# Patient Record
Sex: MALE | Race: ASIAN | Employment: FULL TIME | URBAN - METROPOLITAN AREA
[De-identification: names, ages, dates, MRNs, and addresses within clinical notes are randomized per-mention and may not be internally consistent; named-entity substitution may affect disease eponyms.]

---

## 2019-05-13 ENCOUNTER — APPOINTMENT (OUTPATIENT)
Dept: GENERAL RADIOLOGY | Age: 29
End: 2019-05-13
Attending: EMERGENCY MEDICINE
Payer: OTHER MISCELLANEOUS

## 2019-05-13 ENCOUNTER — HOSPITAL ENCOUNTER (EMERGENCY)
Age: 29
Discharge: HOME | End: 2019-05-14
Attending: EMERGENCY MEDICINE
Payer: OTHER MISCELLANEOUS

## 2019-05-13 VITALS
OXYGEN SATURATION: 99 % | SYSTOLIC BLOOD PRESSURE: 118 MMHG | RESPIRATION RATE: 14 BRPM | HEART RATE: 78 BPM | DIASTOLIC BLOOD PRESSURE: 75 MMHG

## 2019-05-13 DIAGNOSIS — S60.221A CONTUSION OF RIGHT HAND, INITIAL ENCOUNTER: Primary | ICD-10-CM

## 2019-05-13 PROCEDURE — 73130 X-RAY EXAM OF HAND: CPT | Performed by: EMERGENCY MEDICINE

## 2019-05-13 PROCEDURE — 99283 EMERGENCY DEPT VISIT LOW MDM: CPT

## 2019-05-13 RX ORDER — IBUPROFEN 600 MG/1
600 TABLET ORAL ONCE
Status: COMPLETED | OUTPATIENT
Start: 2019-05-13 | End: 2019-05-13

## 2019-05-14 NOTE — ED PROVIDER NOTES
Patient Seen in: [address] In [place]    History   Patient presents with:  Upper Extremity Injury (musculoskeletal)    Stated Complaint: r. hand injury (work injury psd 56)    HPI    66-year-old male no significant past medical hist Cardiovascular: Normal rate and regular rhythm. Pulmonary/Chest: Effort normal and breath sounds normal.   Abdominal: Soft. Bowel sounds are normal.   Musculoskeletal: He exhibits no edema or deformity.    Right hand ecchymosis overlying the distal thir Plan     Clinical Impression:  Contusion of right hand, initial encounter  (primary encounter diagnosis)    Disposition:  Discharge  5/13/2019 11:39 pm    Follow-up:  [facility]  Nimisha Cuenca [facility] [id number]  [phone]

## 2021-07-09 ENCOUNTER — WALK IN (OUTPATIENT)
Dept: URGENT CARE | Age: 31
End: 2021-07-09

## 2021-07-09 VITALS
DIASTOLIC BLOOD PRESSURE: 72 MMHG | RESPIRATION RATE: 14 BRPM | OXYGEN SATURATION: 100 % | TEMPERATURE: 97.4 F | HEIGHT: 64 IN | BODY MASS INDEX: 29.19 KG/M2 | WEIGHT: 171 LBS | SYSTOLIC BLOOD PRESSURE: 118 MMHG | HEART RATE: 76 BPM

## 2021-07-09 DIAGNOSIS — Z11.1 SCREENING-PULMONARY TB: Primary | ICD-10-CM

## 2021-07-09 DIAGNOSIS — Z02.1 PRE-EMPLOYMENT HEALTH SCREENING EXAMINATION: ICD-10-CM

## 2021-07-09 PROCEDURE — 86580 TB INTRADERMAL TEST: CPT | Performed by: NURSE PRACTITIONER

## 2021-07-09 ASSESSMENT — ENCOUNTER SYMPTOMS
POLYDIPSIA: 0
CONFUSION: 0
SORE THROAT: 0
BACK PAIN: 0
WHEEZING: 0
DIARRHEA: 0
COUGH: 0
HEADACHES: 0
SINUS PRESSURE: 0
SINUS PAIN: 0
FEVER: 0
ABDOMINAL PAIN: 0
RHINORRHEA: 0
CHILLS: 0
SHORTNESS OF BREATH: 0
NAUSEA: 0
POLYPHAGIA: 0
VOMITING: 0

## 2021-07-11 ENCOUNTER — WALK IN (OUTPATIENT)
Dept: URGENT CARE | Age: 31
End: 2021-07-11

## 2021-07-11 DIAGNOSIS — Z11.1 ENCOUNTER FOR PPD SKIN TEST READING: Primary | ICD-10-CM

## 2021-07-11 LAB
INDURATION: 0 MM (ref 0–?)
SKIN TEST RESULT: NEGATIVE

## 2021-07-11 PROCEDURE — X1094 NO CHARGE VISIT: HCPCS | Performed by: NURSE PRACTITIONER

## 2023-03-01 ENCOUNTER — OFFICE VISIT (OUTPATIENT)
Dept: FAMILY MEDICINE CLINIC | Facility: CLINIC | Age: 33
End: 2023-03-01
Payer: COMMERCIAL

## 2023-03-01 VITALS
HEIGHT: 64 IN | HEART RATE: 75 BPM | BODY MASS INDEX: 29.88 KG/M2 | TEMPERATURE: 97 F | RESPIRATION RATE: 18 BRPM | SYSTOLIC BLOOD PRESSURE: 118 MMHG | OXYGEN SATURATION: 99 % | WEIGHT: 175 LBS | DIASTOLIC BLOOD PRESSURE: 72 MMHG

## 2023-03-01 DIAGNOSIS — Z00.00 WELLNESS EXAMINATION: Primary | ICD-10-CM

## 2023-03-01 DIAGNOSIS — R73.9 HYPERGLYCEMIA: ICD-10-CM

## 2023-03-01 DIAGNOSIS — R42 DIZZINESS: ICD-10-CM

## 2023-03-01 DIAGNOSIS — Z28.21 IMMUNIZATION DECLINED: ICD-10-CM

## 2023-03-01 DIAGNOSIS — Z00.00 LABORATORY EXAM ORDERED AS PART OF ROUTINE GENERAL MEDICAL EXAMINATION: ICD-10-CM

## 2023-03-01 LAB
ALBUMIN SERPL-MCNC: 3.8 G/DL (ref 3.4–5)
ALBUMIN/GLOB SERPL: 1 {RATIO} (ref 1–2)
ALP LIVER SERPL-CCNC: 73 U/L
ALT SERPL-CCNC: 26 U/L
ANION GAP SERPL CALC-SCNC: 3 MMOL/L (ref 0–18)
AST SERPL-CCNC: 18 U/L (ref 15–37)
ATRIAL RATE: 73 BPM
BASOPHILS # BLD AUTO: 0.04 X10(3) UL (ref 0–0.2)
BASOPHILS NFR BLD AUTO: 0.5 %
BILIRUB SERPL-MCNC: 0.3 MG/DL (ref 0.1–2)
BUN BLD-MCNC: 17 MG/DL (ref 7–18)
CALCIUM BLD-MCNC: 9.5 MG/DL (ref 8.5–10.1)
CHLORIDE SERPL-SCNC: 109 MMOL/L (ref 98–112)
CHOLEST SERPL-MCNC: 140 MG/DL (ref ?–200)
CO2 SERPL-SCNC: 27 MMOL/L (ref 21–32)
CREAT BLD-MCNC: 1.03 MG/DL
EOSINOPHIL # BLD AUTO: 0.08 X10(3) UL (ref 0–0.7)
EOSINOPHIL NFR BLD AUTO: 1.1 %
ERYTHROCYTE [DISTWIDTH] IN BLOOD BY AUTOMATED COUNT: 12.5 %
FASTING PATIENT LIPID ANSWER: YES
FASTING STATUS PATIENT QL REPORTED: YES
GFR SERPLBLD BASED ON 1.73 SQ M-ARVRAT: 99 ML/MIN/1.73M2 (ref 60–?)
GLOBULIN PLAS-MCNC: 3.7 G/DL (ref 2.8–4.4)
GLUCOSE BLD-MCNC: 109 MG/DL (ref 70–99)
HCT VFR BLD AUTO: 51.9 %
HCV AB SERPL QL IA: NONREACTIVE
HDLC SERPL-MCNC: 43 MG/DL (ref 40–59)
HGB BLD-MCNC: 17.2 G/DL
IMM GRANULOCYTES # BLD AUTO: 0.02 X10(3) UL (ref 0–1)
IMM GRANULOCYTES NFR BLD: 0.3 %
LDLC SERPL CALC-MCNC: 85 MG/DL (ref ?–100)
LYMPHOCYTES # BLD AUTO: 2.72 X10(3) UL (ref 1–4)
LYMPHOCYTES NFR BLD AUTO: 36.8 %
MCH RBC QN AUTO: 28.4 PG (ref 26–34)
MCHC RBC AUTO-ENTMCNC: 33.1 G/DL (ref 31–37)
MCV RBC AUTO: 85.8 FL
MONOCYTES # BLD AUTO: 0.71 X10(3) UL (ref 0.1–1)
MONOCYTES NFR BLD AUTO: 9.6 %
NEUTROPHILS # BLD AUTO: 3.83 X10 (3) UL (ref 1.5–7.7)
NEUTROPHILS # BLD AUTO: 3.83 X10(3) UL (ref 1.5–7.7)
NEUTROPHILS NFR BLD AUTO: 51.7 %
NONHDLC SERPL-MCNC: 97 MG/DL (ref ?–130)
OSMOLALITY SERPL CALC.SUM OF ELEC: 290 MOSM/KG (ref 275–295)
P AXIS: 28 DEGREES
P-R INTERVAL: 142 MS
PLATELET # BLD AUTO: 309 10(3)UL (ref 150–450)
POTASSIUM SERPL-SCNC: 3.9 MMOL/L (ref 3.5–5.1)
PROT SERPL-MCNC: 7.5 G/DL (ref 6.4–8.2)
Q-T INTERVAL: 356 MS
QRS DURATION: 92 MS
QTC CALCULATION (BEZET): 392 MS
R AXIS: 58 DEGREES
RBC # BLD AUTO: 6.05 X10(6)UL
SODIUM SERPL-SCNC: 139 MMOL/L (ref 136–145)
T AXIS: 25 DEGREES
TRIGL SERPL-MCNC: 57 MG/DL (ref 30–149)
TSI SER-ACNC: 1.27 MIU/ML (ref 0.36–3.74)
VENTRICULAR RATE: 73 BPM
VLDLC SERPL CALC-MCNC: 9 MG/DL (ref 0–30)
WBC # BLD AUTO: 7.4 X10(3) UL (ref 4–11)

## 2023-03-01 PROCEDURE — 93000 ELECTROCARDIOGRAM COMPLETE: CPT | Performed by: FAMILY MEDICINE

## 2023-03-01 PROCEDURE — 83036 HEMOGLOBIN GLYCOSYLATED A1C: CPT | Performed by: FAMILY MEDICINE

## 2023-03-01 PROCEDURE — 3078F DIAST BP <80 MM HG: CPT | Performed by: FAMILY MEDICINE

## 2023-03-01 PROCEDURE — 3008F BODY MASS INDEX DOCD: CPT | Performed by: FAMILY MEDICINE

## 2023-03-01 PROCEDURE — 86803 HEPATITIS C AB TEST: CPT | Performed by: FAMILY MEDICINE

## 2023-03-01 PROCEDURE — 99203 OFFICE O/P NEW LOW 30 MIN: CPT | Performed by: FAMILY MEDICINE

## 2023-03-01 PROCEDURE — 80061 LIPID PANEL: CPT | Performed by: FAMILY MEDICINE

## 2023-03-01 PROCEDURE — 3074F SYST BP LT 130 MM HG: CPT | Performed by: FAMILY MEDICINE

## 2023-03-01 PROCEDURE — 80050 GENERAL HEALTH PANEL: CPT | Performed by: FAMILY MEDICINE

## 2023-03-01 PROCEDURE — 99385 PREV VISIT NEW AGE 18-39: CPT | Performed by: FAMILY MEDICINE

## 2023-03-01 RX ORDER — MECLIZINE HYDROCHLORIDE 25 MG/1
25 TABLET ORAL 3 TIMES DAILY PRN
Qty: 30 TABLET | Refills: 0 | Status: SHIPPED | OUTPATIENT
Start: 2023-03-01

## 2023-03-01 NOTE — PATIENT INSTRUCTIONS
Drink at least 64 ounces of water daily. Eat small, frequent meals throughout the day so your blood sugar does not drop. Limit caffeine to one serving daily. Be careful with change in position so you do not fall. Take the Meclizine 25 mg one tablet every 6-8 hours as needed for the dizziness. Do the Epley exercises with your next bout of dizziness. Go to the ER if the dizziness worsens or is associated with a severe headache, change in vision, slurred speech, numbness or weakness into the arms or legs, chest pain or racing heart beat. I will contact you with your test results once available.

## 2023-03-02 DIAGNOSIS — R73.9 HYPERGLYCEMIA: Primary | ICD-10-CM

## 2023-03-02 LAB
EST. AVERAGE GLUCOSE BLD GHB EST-MCNC: 103 MG/DL (ref 68–126)
HBA1C MFR BLD: 5.2 % (ref ?–5.7)

## 2023-03-14 ENCOUNTER — TELEPHONE (OUTPATIENT)
Dept: FAMILY MEDICINE CLINIC | Facility: CLINIC | Age: 33
End: 2023-03-14

## 2023-03-14 NOTE — TELEPHONE ENCOUNTER
Pt called to schedule appt with Dr. Ben Randle for tension headaches and dizziness. Pt was made an appt for   Future Appointments   Date Time Provider Israel Miranda   3/16/2023  2:30 PM Kip Toledo, DO [facility]     Sending to Triage due to c/o dizziness    Please advise. Thank you.

## 2023-03-14 NOTE — TELEPHONE ENCOUNTER
Office visit on 3/1/23 was for recurrent episodes of dizziness. At that time, patient was instructed to return if symptoms worsen or don't improve. Was also given detailed instructions for when to go to ER. Appt scheduled for 3/16/23 is fine.

## 2023-04-18 ENCOUNTER — OFFICE VISIT (OUTPATIENT)
Dept: FAMILY MEDICINE CLINIC | Facility: CLINIC | Age: 33
End: 2023-04-18
Payer: COMMERCIAL

## 2023-04-18 VITALS
DIASTOLIC BLOOD PRESSURE: 80 MMHG | OXYGEN SATURATION: 97 % | TEMPERATURE: 98 F | RESPIRATION RATE: 18 BRPM | SYSTOLIC BLOOD PRESSURE: 118 MMHG | HEART RATE: 76 BPM | WEIGHT: 173 LBS | BODY MASS INDEX: 29.53 KG/M2 | HEIGHT: 64 IN

## 2023-04-18 DIAGNOSIS — S39.012A STRAIN OF LUMBAR REGION, INITIAL ENCOUNTER: Primary | ICD-10-CM

## 2023-04-18 DIAGNOSIS — M54.2 NECK PAIN: ICD-10-CM

## 2023-04-18 PROCEDURE — 3008F BODY MASS INDEX DOCD: CPT | Performed by: FAMILY MEDICINE

## 2023-04-18 PROCEDURE — 3079F DIAST BP 80-89 MM HG: CPT | Performed by: FAMILY MEDICINE

## 2023-04-18 PROCEDURE — 3074F SYST BP LT 130 MM HG: CPT | Performed by: FAMILY MEDICINE

## 2023-04-18 PROCEDURE — 99213 OFFICE O/P EST LOW 20 MIN: CPT | Performed by: FAMILY MEDICINE

## 2023-04-18 RX ORDER — PREDNISONE 20 MG/1
TABLET ORAL
COMMUNITY
Start: 2023-04-17

## 2023-04-18 RX ORDER — CYCLOBENZAPRINE HCL 10 MG
TABLET ORAL
COMMUNITY
Start: 2023-04-17

## 2023-06-26 ENCOUNTER — OFFICE VISIT (OUTPATIENT)
Dept: FAMILY MEDICINE CLINIC | Facility: CLINIC | Age: 33
End: 2023-06-26
Payer: COMMERCIAL

## 2023-06-26 VITALS
HEIGHT: 64 IN | RESPIRATION RATE: 16 BRPM | HEART RATE: 86 BPM | WEIGHT: 173 LBS | DIASTOLIC BLOOD PRESSURE: 76 MMHG | OXYGEN SATURATION: 98 % | SYSTOLIC BLOOD PRESSURE: 120 MMHG | BODY MASS INDEX: 29.53 KG/M2

## 2023-06-26 DIAGNOSIS — H93.13 TINNITUS OF BOTH EARS: ICD-10-CM

## 2023-06-26 DIAGNOSIS — M62.838 TRAPEZIUS MUSCLE SPASM: ICD-10-CM

## 2023-06-26 DIAGNOSIS — D17.1 LIPOMA OF TORSO: ICD-10-CM

## 2023-06-26 DIAGNOSIS — S16.1XXD CERVICAL STRAIN, SUBSEQUENT ENCOUNTER: Primary | ICD-10-CM

## 2023-06-26 PROCEDURE — 99213 OFFICE O/P EST LOW 20 MIN: CPT | Performed by: FAMILY MEDICINE

## 2023-06-26 PROCEDURE — 3008F BODY MASS INDEX DOCD: CPT | Performed by: FAMILY MEDICINE

## 2023-06-26 PROCEDURE — 3078F DIAST BP <80 MM HG: CPT | Performed by: FAMILY MEDICINE

## 2023-06-26 PROCEDURE — 3074F SYST BP LT 130 MM HG: CPT | Performed by: FAMILY MEDICINE

## 2023-06-26 NOTE — PATIENT INSTRUCTIONS
Follow up with your Worker's Comp doctor regarding your worsening neck pain. You do have muscle spasm on exam and would benefit from some physical therapy. You may take Ibuprofen 600 mg every 6 hours as needed for pain. You may use ice or moist heat 10-15 minutes several times a day to the neck and upper back to help with the muscle spasm. Always ice through a towel or clothing, never directly on bare skin to avoid frost bite. Do not ice longer than 10-15 minutes at a time but you can do it multiple times a day. . Do not sleep with a heating pad as prolonged heat will make the spasm worse. You do not need to worry about the lump on your chest wall. This is a lipoma which is a fat globule and is benign.

## 2023-10-16 ENCOUNTER — TELEPHONE (OUTPATIENT)
Dept: FAMILY MEDICINE CLINIC | Facility: CLINIC | Age: 33
End: 2023-10-16

## 2023-10-16 ENCOUNTER — MED REC SCAN ONLY (OUTPATIENT)
Dept: FAMILY MEDICINE CLINIC | Facility: CLINIC | Age: 33
End: 2023-10-16

## 2023-10-16 NOTE — TELEPHONE ENCOUNTER
Received mail on 10/13/2023 requesting medical records for subpoena. Requesting any and all medical records for patient: Yesenia Neal. Riri Olea, [address] [place],[address], [place] [address]  Phone: [phone]  Fax: [phone]     Original sent to Scan STAT, copy sent to medical records. Red Tag # [id number].

## 2024-07-23 ENCOUNTER — TELEPHONE (OUTPATIENT)
Dept: FAMILY MEDICINE CLINIC | Facility: CLINIC | Age: 34
End: 2024-07-23

## 2024-07-23 ENCOUNTER — OFFICE VISIT (OUTPATIENT)
Dept: FAMILY MEDICINE CLINIC | Facility: CLINIC | Age: 34
End: 2024-07-23
Payer: COMMERCIAL

## 2024-07-23 VITALS
DIASTOLIC BLOOD PRESSURE: 80 MMHG | BODY MASS INDEX: 30 KG/M2 | WEIGHT: 176 LBS | HEART RATE: 94 BPM | TEMPERATURE: 98 F | OXYGEN SATURATION: 97 % | SYSTOLIC BLOOD PRESSURE: 138 MMHG | RESPIRATION RATE: 16 BRPM

## 2024-07-23 DIAGNOSIS — L25.5 CONTACT DERMATITIS DUE TO PLANT: Primary | ICD-10-CM

## 2024-07-23 PROCEDURE — 3075F SYST BP GE 130 - 139MM HG: CPT | Performed by: NURSE PRACTITIONER

## 2024-07-23 PROCEDURE — 99213 OFFICE O/P EST LOW 20 MIN: CPT | Performed by: NURSE PRACTITIONER

## 2024-07-23 PROCEDURE — 3079F DIAST BP 80-89 MM HG: CPT | Performed by: NURSE PRACTITIONER

## 2024-07-23 RX ORDER — PREDNISONE 20 MG/1
TABLET ORAL
Qty: 21 TABLET | Refills: 0 | Status: SHIPPED | OUTPATIENT
Start: 2024-07-23

## 2024-07-23 RX ORDER — TRIAMCINOLONE ACETONIDE 1 MG/G
CREAM TOPICAL 2 TIMES DAILY
Qty: 45 G | Refills: 0 | Status: SHIPPED | OUTPATIENT
Start: 2024-07-23 | End: 2024-08-13

## 2024-07-23 NOTE — TELEPHONE ENCOUNTER
Roldan has been experiencing blisters that are appearing on his body. It started on Saturday. It is on your arms and legs. They itch and clear liquid is coming out of them. He said he has been putting cream on them. He is scheduled to be seen August 15th with Dr. Wesley. Please advise if patient should be seen sooner.

## 2024-07-23 NOTE — PROGRESS NOTES
Subjective:   Patient ID: Roldan Marks is a 33 year old male.    Rash  This is a new problem. Episode onset: in the past three days. The problem has been gradually worsening since onset. The affected locations include the right arm, right elbow, left arm and left elbow. The rash is characterized by blistering, redness and itchiness. He was exposed to plant contact. Past treatments include moisturizer and antihistamine. The treatment provided moderate relief.       History/Other:   Review of Systems   Skin:  Positive for rash.        As above in HPI   All other systems reviewed and are negative.    No current outpatient medications on file.     Allergies:  Allergies   Allergen Reactions    Cephalosporins NAUSEA AND VOMITING    Pcn [Penicillins] RASH    Ceclor [Ceclor]      /80   Pulse 94   Temp 98.3 °F (36.8 °C) (Oral)   Resp 16   Wt 176 lb (79.8 kg)   SpO2 97%   BMI 30.21 kg/m²     Objective:   Physical Exam  Constitutional:       General: He is not in acute distress.     Appearance: Normal appearance. He is not ill-appearing.   HENT:      Head: Normocephalic and atraumatic.   Cardiovascular:      Rate and Rhythm: Normal rate and regular rhythm.      Pulses: Normal pulses.   Pulmonary:      Effort: Pulmonary effort is normal. No respiratory distress.      Breath sounds: Normal breath sounds.   Musculoskeletal:         General: Normal range of motion.      Cervical back: Normal range of motion.   Lymphadenopathy:      Cervical: No cervical adenopathy.   Skin:     General: Skin is warm and dry.      Findings: Rash present.             Comments: Clustered/scattered pruritic blisters noted where marked. Sparing clothed areas.   Neurological:      General: No focal deficit present.      Mental Status: He is alert.   Psychiatric:         Mood and Affect: Mood normal.         Assessment & Plan:   1. Contact dermatitis due to plant        No orders of the defined types were placed in this encounter.      Meds  This Visit:  Requested Prescriptions      No prescriptions requested or ordered in this encounter       Patient Instructions   Please use Triamcinolone cream to affected areas sparingly twice daily for up to three weeks.  Take Zyrtec nightly for the next two weeks to help with rash/itching.  Use cold compress to any itchy areas for 5  minutes to help soothe areas.  Wash with soap and water. Hot water and sweat will activate itch.  Follow up with Dr. Wesley if symptoms worsen. You may start Prednisone daily by mouth EARLY IN DAY if symptoms worsening over the next week, but this medication may make you anxious/sleepless.           60

## 2024-07-23 NOTE — PATIENT INSTRUCTIONS
Please use Triamcinolone cream to affected areas sparingly twice daily for up to three weeks.  Take Zyrtec nightly for the next two weeks to help with rash/itching.  Use cold compress to any itchy areas for 5  minutes to help soothe areas.  Wash with soap and water. Hot water and sweat will activate itch.  Follow up with Dr. Wesley if symptoms worsen. You may start Prednisone daily by mouth EARLY IN DAY if symptoms worsening over the next week, but this medication may make you anxious/sleepless.

## 2024-07-23 NOTE — TELEPHONE ENCOUNTER
I spoke w/ patient. He developed blisters on his arms and legs. They are itchy and fluid filled. He has taken Benadryl but it is still spreading. He was outside the day before doing garden/yard work. Does not think he got sunburn. Recommended evaluation at the immediate care today. Verbalized understanding. Scheduled him for 8/16 for AP and follow up on the rash.

## 2025-02-24 ENCOUNTER — APPOINTMENT (OUTPATIENT)
Dept: GENERAL RADIOLOGY | Age: 35
End: 2025-02-24
Attending: PHYSICIAN ASSISTANT
Payer: COMMERCIAL

## 2025-02-24 ENCOUNTER — TELEPHONE (OUTPATIENT)
Dept: FAMILY MEDICINE CLINIC | Facility: CLINIC | Age: 35
End: 2025-02-24

## 2025-02-24 ENCOUNTER — HOSPITAL ENCOUNTER (OUTPATIENT)
Age: 35
Discharge: HOME | End: 2025-02-24
Payer: COMMERCIAL

## 2025-02-24 VITALS
SYSTOLIC BLOOD PRESSURE: 130 MMHG | DIASTOLIC BLOOD PRESSURE: 80 MMHG | TEMPERATURE: 99 F | RESPIRATION RATE: 18 BRPM | HEART RATE: 117 BPM | OXYGEN SATURATION: 92 %

## 2025-02-24 DIAGNOSIS — J98.01 BRONCHOSPASM: ICD-10-CM

## 2025-02-24 DIAGNOSIS — J18.9 COMMUNITY ACQUIRED PNEUMONIA OF LEFT LOWER LOBE OF LUNG: Primary | ICD-10-CM

## 2025-02-24 PROCEDURE — 71046 X-RAY EXAM CHEST 2 VIEWS: CPT | Performed by: PHYSICIAN ASSISTANT

## 2025-02-24 PROCEDURE — 99213 OFFICE O/P EST LOW 20 MIN: CPT

## 2025-02-24 PROCEDURE — 99204 OFFICE O/P NEW MOD 45 MIN: CPT

## 2025-02-24 RX ORDER — PREDNISONE 20 MG/1
40 TABLET ORAL DAILY
Qty: 10 TABLET | Refills: 0 | Status: SHIPPED | OUTPATIENT
Start: 2025-02-24 | End: 2025-03-01

## 2025-02-24 RX ORDER — ALBUTEROL SULFATE 90 UG/1
2 INHALANT RESPIRATORY (INHALATION) ONCE
Status: COMPLETED | OUTPATIENT
Start: 2025-02-24 | End: 2025-02-24

## 2025-02-24 RX ORDER — LEVOFLOXACIN 750 MG/1
750 TABLET, FILM COATED ORAL DAILY
Qty: 5 TABLET | Refills: 0 | Status: SHIPPED | OUTPATIENT
Start: 2025-02-24 | End: 2025-03-01

## 2025-02-24 NOTE — ED PROVIDER NOTES
Patient Seen in: [facility]      History     Chief Complaint   Patient presents with    Cough     Stated Complaint: Cold Symp    Subjective:   The history is provided by the patient.         34-year-old male with PMH of anxiety presents to the immediate care due to cough for the past 8 days.  Cough is productive in nature.  Initially did have a fever which is since resolved.  Continues to have nasal congestion.  Now with wheezing and difficulty breathing.  He has no history of asthma smoking or COPD.  This patient has been taking Mucinex over-the-counter without relief.  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    Objective:     History reviewed. No pertinent past medical history.           History reviewed. No pertinent surgical history.             No pertinent social history.            Review of Systems   Constitutional:  Negative for chills, fatigue and fever.   HENT:  Positive for congestion. Negative for sore throat, trouble swallowing and voice change.    Respiratory:  Positive for cough and wheezing. Negative for shortness of breath and stridor.    Cardiovascular: Negative.    Gastrointestinal: Negative.    Neurological: Negative.        Positive for stated complaint: Cold Symp  Other systems are as noted in HPI.  Constitutional and vital signs reviewed.      All other systems reviewed and negative except as noted above.    Physical Exam     ED Triage Vitals [02/24/25 1325]   /80   Pulse 80   Resp 18   Temp 98.7 °F (37.1 °C)   Temp src Oral   SpO2 96 %   O2 Device None (Room air)       Current Vitals:   Vital Signs  BP: 130/80  Pulse: 117  Resp: 18  Temp: 98.7 °F (37.1 °C)  Temp src: Oral    Oxygen Therapy  SpO2: 92 %  O2 Device: None (Room air) (provider notified)        Physical Exam  Vitals and nursing note reviewed.   Constitutional:       General: He is not in acute distress.     Appearance: Normal appearance.   HENT:      Right Ear: Tympanic membrane, ear canal  and external ear normal.      Left Ear: Tympanic membrane, ear canal and external ear normal.      Nose: Congestion present.      Mouth/Throat:      Mouth: Mucous membranes are moist.      Pharynx: No oropharyngeal exudate or posterior oropharyngeal erythema.   Eyes:      Extraocular Movements: Extraocular movements intact.      Conjunctiva/sclera: Conjunctivae normal.      Pupils: Pupils are equal, round, and reactive to light.   Cardiovascular:      Rate and Rhythm: Normal rate and regular rhythm.   Pulmonary:      Effort: No respiratory distress.      Breath sounds: Wheezing and rhonchi present.      Comments: All lung fields  Musculoskeletal:         General: Normal range of motion.   Skin:     General: Skin is warm.   Neurological:      General: No focal deficit present.      Mental Status: He is alert and oriented to person, place, and time.   Psychiatric:         Mood and Affect: Mood normal.         Behavior: Behavior normal.             ED Course   Labs Reviewed - No data to display     XR CHEST PA + LAT CHEST (CPT=71046)    Result Date: 2/24/2025  PROCEDURE:  XR CHEST PA + LAT CHEST (CPT=71046)  INDICATIONS:  Cough  COMPARISON:  None.  TECHNIQUE:  PA and lateral chest radiographs were obtained.  PATIENT STATED HISTORY: (As transcribed by Technologist)  cough and runny nose for one week    FINDINGS:  There are some subtle patchy airspace opacities within the lingula suspicious for areas of pneumonia.  Heart size is within normal limits.  No pleural effusion is seen.  Mediastinal and hilar contours are normal.            CONCLUSION:  Some patchy airspace opacities are present within the lungs suspicious for areas of pneumonia.  Follow-up is recommended to ensure resolution.  If clinical symptoms persist then consider CT.   LOCATION:  [id number]   Dictated by (CST): Nikita Carrillo MD on 2/24/2025 at 2:28 PM     Finalized by (CST): Nikita Carrillo MD on 2/24/2025 at 2:29 PM                 [facility]   Ddx -URI with cough,  bronchitis, CAP, reactive airway, COVID, influenza    And examined patient is afebrile nontoxic.  Vital signs are stable.  He has audible wheezing and rhonchi in all lung fields.  HEENT exam shows mild nasal congestion rest exam is unremarkable.  Offered a nebulizer however patient states he has significant anxiety does not believe he can tolerate a nebulizer treatment.  Was given 2 puffs of albuterol inhaler tolerated.  Still having some wheezing.  Sats noted between 92% and 96%.  He is mildly tachycardic he is afebrile his other vital signs are stable.  Admits to have significant medical anxiety.  He Believes this is the etiology of his tachycardia.  No respiratory distress.  His chest x-ray does confirm pneumonia.  Allergic to both amoxicillin and cephalosporins.  Therefore will prescribe Levaquin.  Will also prescribe prednisone and advised to continue use albuterol inhaler as needed.  Strict ER precautions were discussed.  All questions were answered and patient comfortable treatment discharge        Medical Decision Making  Problems Addressed:  Bronchospasm: acute illness or injury  Community acquired pneumonia of left lower lobe of lung: acute illness or injury        Disposition and Plan     Clinical Impression:  1. Community acquired pneumonia of left lower lobe of lung    2. Bronchospasm         Disposition:  Discharge  2/24/2025  2:46 pm    Follow-up:  Johanna Wesley DO  [address] [address]  [phone]                Medications Prescribed:  Discharge Medication List as of 2/24/2025  2:53 PM        START taking these medications    Details   levoFLOXacin 750 MG Oral Tab Take 1 tablet (750 mg total) by mouth daily for 5 days., Normal, Disp-5 tablet, R-0      !! predniSONE 20 MG Oral Tab Take 2 tablets (40 mg total) by mouth daily for 5 days., Normal, Disp-10 tablet, R-0       !! - Potential duplicate medications found. Please discuss with provider.              Supplementary  Documentation:

## 2025-02-24 NOTE — TELEPHONE ENCOUNTER
Spoke with patient. Patient has a lot of coughing, mucus coming up. Patient stated that he has blood in his sputum after coughing  Symptoms have been present x 1 wk. No shortness of breath. Had mild fever. Patient has been taking Mucinex, drinking plenty of fluids but has loss of appetite. Advised patient to immediate care or emergency room for further evaluation. Patient voiced understanding and agreeable.

## 2025-02-24 NOTE — ED INITIAL ASSESSMENT (HPI)
Pt here for cough/congestion since last Sunday.     Feeling tired.    Denies fever.   Feels like he has been diaphoretic recently.     Bodyaches.

## 2025-02-24 NOTE — DISCHARGE INSTRUCTIONS
Continue to use albuterol inhaler every 4-6 hours as needed  Take prednisone daily until gone  Take Levaquin once a day take with food.  Close follow-up with your primary care doctor  Return to the ER symptoms worsen

## 2025-02-24 NOTE — TELEPHONE ENCOUNTER
Imeliane Marks  LOV: Visit date not found       Imad calling experiencing: breathing in and out causes mucus, random coughing, bloody mucus, runny nose and chest congestion, muscle pain.   Duration/Onset of symptom:since last week  Appointment Offered: 2/25/25 Dr. Johanna Wesley

## 2025-02-27 ENCOUNTER — TELEPHONE (OUTPATIENT)
Dept: FAMILY MEDICINE CLINIC | Facility: CLINIC | Age: 35
End: 2025-02-27

## 2025-02-27 NOTE — TELEPHONE ENCOUNTER
Spoke with patient. Patient is feeling better. Patient still has chest congestion and some coughing. Patient noticed some shortness of breath after walking. Patient is still taking his prednisone and antibiotic as well as an inhaler. Patient scheduled for a follow up visit 3/3/25.

## 2025-02-27 NOTE — TELEPHONE ENCOUNTER
Sleeping better, a lot of mucus, a lot of coughing. Was advised to follow up with provider no appointment available soon.         Patient calling for a refill on predniSONE 20 MG Oral Tab, levoFLOXacin 750 MG Oral Tab.     [id number] - [place], [place] - [address] AT [facility] [address], [phone], [phone] [[id number]]

## 2025-03-03 ENCOUNTER — TELEPHONE (OUTPATIENT)
Dept: FAMILY MEDICINE CLINIC | Facility: CLINIC | Age: 35
End: 2025-03-03

## 2025-03-03 ENCOUNTER — OFFICE VISIT (OUTPATIENT)
Dept: FAMILY MEDICINE CLINIC | Facility: CLINIC | Age: 35
End: 2025-03-03
Payer: COMMERCIAL

## 2025-03-03 VITALS
HEIGHT: 64 IN | BODY MASS INDEX: 29.71 KG/M2 | TEMPERATURE: 98 F | SYSTOLIC BLOOD PRESSURE: 122 MMHG | OXYGEN SATURATION: 95 % | DIASTOLIC BLOOD PRESSURE: 84 MMHG | WEIGHT: 174 LBS | HEART RATE: 114 BPM | RESPIRATION RATE: 18 BRPM

## 2025-03-03 DIAGNOSIS — J18.9 COMMUNITY ACQUIRED PNEUMONIA, UNSPECIFIED LATERALITY: Primary | ICD-10-CM

## 2025-03-03 DIAGNOSIS — J98.01 ACUTE BRONCHOSPASM: ICD-10-CM

## 2025-03-03 PROBLEM — F41.9 ANXIETY: Status: ACTIVE | Noted: 2025-03-03

## 2025-03-03 PROCEDURE — 3074F SYST BP LT 130 MM HG: CPT | Performed by: FAMILY MEDICINE

## 2025-03-03 PROCEDURE — G2211 COMPLEX E/M VISIT ADD ON: HCPCS | Performed by: FAMILY MEDICINE

## 2025-03-03 PROCEDURE — 3079F DIAST BP 80-89 MM HG: CPT | Performed by: FAMILY MEDICINE

## 2025-03-03 PROCEDURE — 99213 OFFICE O/P EST LOW 20 MIN: CPT | Performed by: FAMILY MEDICINE

## 2025-03-03 PROCEDURE — 3008F BODY MASS INDEX DOCD: CPT | Performed by: FAMILY MEDICINE

## 2025-03-03 RX ORDER — FLUTICASONE FUROATE 100 UG/1
1 POWDER RESPIRATORY (INHALATION) DAILY
Qty: 30 EACH | Refills: 0 | Status: SHIPPED | OUTPATIENT
Start: 2025-03-03

## 2025-03-03 NOTE — PROGRESS NOTES
The  [employer] Cures Act makes medical notes like these available to patients in the interest of transparency. Please be advised this is a medical document. Medical documents are intended to carry relevant information, facts as evident, and the clinical opinion of the practitioner. The medical note is intended as peer to peer communication and may appear blunt or direct. It is written in medical language and may contain abbreviations or verbiage that are unfamiliar.       Roldan Marks is a 34 year old male.    HPI:     Chief Complaint   Patient presents with    ER F/U       This 34-year-old male presents to the office for follow-up on immediate care visit dated 2025.  The patient was diagnosed with pneumonia and was treated with levofloxacin 750 mg and prednisone 40 mg daily for 5 days and given an albuterol inhaler.  He has had no further fevers.  He continues to have a nonproductive cough.  He still has occasional shortness of breath and wheezing and has been using his albuterol inhaler as needed.  He has no previous history for asthma or pneumonia.  He is denying any nausea, vomiting, diarrhea, chest pain or hemoptysis.  He is concerned because he is still coughing.    HISTORY:  Past Medical History:    Anxiety    Pneumonia      History reviewed. No pertinent surgical history.   Family History   Problem Relation Age of Onset    Diabetes Mother     Thyroid Disorder Mother       Social History:   Social History     Socioeconomic History    Marital status: Single   Tobacco Use    Smoking status: Former     Types: Cigars     Quit date: 2018     Years since quittin.0    Smokeless tobacco: Former     Types: Chew     Quit date:    Vaping Use    Vaping status: Never Used   Substance and Sexual Activity    Alcohol use: No    Drug use: No    Sexual activity: Yes        Medications (Active prior to today's visit):  Current Outpatient Medications   Medication Sig Dispense Refill    fluticasone furoate  (ARNUITY ELLIPTA) 100 MCG/ACT Inhalation Aerosol Powder, Breath Activated Inhale 1 puff into the lungs daily. 30 each 0       Allergies:  Allergies[1]    ROS:     Pertinent positives and pertinent negatives are as listed in HPI.    All other review of symptoms were reviewed and negative.    PHYSICAL EXAM:   /84 (BP Location: Left arm, Patient Position: Sitting, Cuff Size: adult)   Pulse 114   Temp 98.1 °F (36.7 °C)   Resp 18   Ht 5' 4\" (1.626 m)   Wt 174 lb (78.9 kg)   SpO2 95%   BMI 29.87 kg/m²     Wt Readings from Last 3 Encounters:   03/03/25 174 lb (78.9 kg)   07/23/24 176 lb (79.8 kg)   06/26/23 173 lb (78.5 kg)       BP Readings from Last 3 Encounters:   03/03/25 122/84   02/24/25 130/80   07/23/24 138/80       General: Well-nourished, well hydrated. In no respiratory distress, bronchospastic cough is noted. No pallor.   HEENT: Normocephalic, atraumatic.  CHERYL, EOMI, Sclera clear and non icteric bilaterally. TM's normal, nose without congestion, pharynx without redness or exudates. Moist mucous membranes.  Neck: Supple. No lymphadenopathy. No thyromegaly. No bruits noted.  Heart: tachycardic but regular without S3 or S4 or murmur.  Lungs: Clear to auscultation bilaterally with mild prolonged expiratory phase. No ecophony is noted.. No rales, rhonchi or wheezes. No tachypnea or retractions noted.  Abdomen: Soft, nontender, nondistended, normal bowel sounds. No organomegaly. No guarding, rigidity or rebound noted.  Extremities: No edema bilaterally.  Skin: Warm and dry.  Neuro: Alert and oriented x 3.normal gait.  Psych: Patient is very anxious.     ASSESSMENT/PLAN:   34 year old male with    LABS This Visit:    Results for orders placed or performed in visit on 03/01/23   EKG with interpretation and Report -IN OFFICE [17107]    Collection Time: 03/01/23  8:37 AM   Result Value Ref Range    Ventricular rate 73 BPM    Atrial rate 73 BPM    P-R Interval 142 ms    QRS Duration 92 ms    Q-T Interval  356 ms    QTC Calculation (Bezet) 392 ms    P Axis 28 degrees    R Axis 58 degrees    T Axis 25 degrees   Comp Metabolic Panel (14)    Collection Time: 03/01/23  8:48 AM   Result Value Ref Range    Glucose 109 (H) 70 - 99 mg/dL    Sodium 139 136 - 145 mmol/L    Potassium 3.9 3.5 - 5.1 mmol/L    Chloride 109 98 - 112 mmol/L    CO2 27.0 21.0 - 32.0 mmol/L    Anion Gap 3 0 - 18 mmol/L    BUN 17 7 - 18 mg/dL    Creatinine 1.03 0.70 - 1.30 mg/dL    Calcium, Total 9.5 8.5 - 10.1 mg/dL    Calculated Osmolality 290 275 - 295 mOsm/kg    eGFR-Cr 99 >=60 mL/min/1.73m2    AST 18 15 - 37 U/L    ALT 26 16 - 61 U/L    Alkaline Phosphatase 73 45 - 117 U/L    Bilirubin, Total 0.3 0.1 - 2.0 mg/dL    Total Protein 7.5 6.4 - 8.2 g/dL    Albumin 3.8 3.4 - 5.0 g/dL    Globulin  3.7 2.8 - 4.4 g/dL    A/G Ratio 1.0 1.0 - 2.0    Patient Fasting for CMP? Yes    Lipid Panel    Collection Time: 03/01/23  8:48 AM   Result Value Ref Range    Cholesterol, Total 140 <200 mg/dL    HDL Cholesterol 43 40 - 59 mg/dL    Triglycerides 57 30 - 149 mg/dL    LDL Cholesterol 85 <100 mg/dL    VLDL 9 0 - 30 mg/dL    Non HDL Chol 97 <130 mg/dL    Patient Fasting for Lipid? Yes    TSH W Reflex To Free T4    Collection Time: 03/01/23  8:48 AM   Result Value Ref Range    TSH 1.270 0.358 - 3.740 mIU/mL   HCV Antibody    Collection Time: 03/01/23  8:48 AM   Result Value Ref Range    Hepatitis C Virus Nonreactive Nonreactive    Hemoglobin A1C [E]    Collection Time: 03/01/23  8:48 AM   Result Value Ref Range    HgbA1C 5.2 <5.7 %    Estimated Average Glucose 103 68 - 126 mg/dL   CBC W/ DIFFERENTIAL    Collection Time: 03/01/23  8:48 AM   Result Value Ref Range    WBC 7.4 4.0 - 11.0 x10(3) uL    RBC 6.05 (H) 4.30 - 5.70 x10(6)uL    HGB 17.2 13.0 - 17.5 g/dL    HCT 51.9 39.0 - 53.0 %    .0 150.0 - 450.0 10(3)uL    MCV 85.8 80.0 - 100.0 fL    MCH 28.4 26.0 - 34.0 pg    MCHC 33.1 31.0 - 37.0 g/dL    RDW 12.5 %    Neutrophil Absolute Prelim 3.83 1.50 - 7.70 x10 (3)  uL    Neutrophil Absolute 3.83 1.50 - 7.70 x10(3) uL    Lymphocyte Absolute 2.72 1.00 - 4.00 x10(3) uL    Monocyte Absolute 0.71 0.10 - 1.00 x10(3) uL    Eosinophil Absolute 0.08 0.00 - 0.70 x10(3) uL    Basophil Absolute 0.04 0.00 - 0.20 x10(3) uL    Immature Granulocyte Absolute 0.02 0.00 - 1.00 x10(3) uL    Neutrophil % 51.7 %    Lymphocyte % 36.8 %    Monocyte % 9.6 %    Eosinophil % 1.1 %    Basophil % 0.5 %    Immature Granulocyte % 0.3 %        1. Community acquired pneumonia, unspecified laterality  2. Acute bronchospasm    I did discuss pneumonia and bronchospasm with the patient.  I did tell him it is common for the cough to linger 6 to 8 weeks after the infection.  Symptomatic treatment with fluids and humidified air was reviewed.  I am going to start him on Arnuity Ellipta to calm the inflammation to his airways. He is to use 1 inhalation daily.  He was reminded to rinse his mouth and brush his tongue after usage to avoid getting thrush.  He should continue with the albuterol inhaler as needed for any shortness of breath or wheezing.  He is to contact the office if he is having any worsening symptoms or if the fever recurs.    - fluticasone furoate (ARNUITY ELLIPTA) 100 MCG/ACT Inhalation Aerosol Powder, Breath Activated; Inhale 1 puff into the lungs daily.  Dispense: 30 each; Refill: 0     3.  Health maintenance    The patient was advised to return in 4 weeks for his annual physical and we will reassess his bronchospasm at that time.    Health Maintenance:    Health Maintenance   Topic Date Due    DTaP,Tdap,and Td Vaccines (1 - Tdap) Never done    Annual Physical  03/01/2024    COVID-19 Vaccine (3 - 2024-25 season) 09/01/2024    Influenza Vaccine (1) Never done    Annual Depression Screening  Completed    Pneumococcal Vaccine: Birth to 50yrs  Aged Out    Meningococcal B Vaccine  Aged Out         Meds This Visit:  Requested Prescriptions     Signed Prescriptions Disp Refills    fluticasone furoate  (ARNUITY ELLIPTA) 100 MCG/ACT Inhalation Aerosol Powder, Breath Activated 30 each 0     Sig: Inhale 1 puff into the lungs daily.       Imaging & Referrals:  None     Patient understands plan and follow-up.  Follow up in 4 weeks for annual physical and recheck on bronchospasm.    3/3/2025  Johanna Wesley DO    Total time: 20 minutes including precharting, H&P, plan of care    This dictation was performed with a verbal recognition program (DRAGON) and it was checked for errors. It is possible that there are still dictated errors within this office note. If so, please bring any errors to my attention for an addendum. All efforts were made to ensure that this office note is accurate         [1]   Allergies  Allergen Reactions    Amoxicillin HIVES    Cephalosporins NAUSEA AND VOMITING    Pcn [Penicillins] RASH    Ceclor [Ceclor]

## 2025-03-03 NOTE — TELEPHONE ENCOUNTER
Patient calling and went to  his rx fluticasone furoate (ARNUITY ELLIPTA) 100 MCG/ACT Inhalation Aerosol Powder, Breath Activated  he was told it would be $300 - is there anything cheaper?     Please advise

## 2025-03-04 NOTE — TELEPHONE ENCOUNTER
Spoke with patient. Patient will call to see if Qvar is less expensive and let this office know.

## 2025-04-15 ENCOUNTER — TELEPHONE (OUTPATIENT)
Dept: FAMILY MEDICINE CLINIC | Facility: CLINIC | Age: 35
End: 2025-04-15

## 2025-04-15 NOTE — TELEPHONE ENCOUNTER
Roldan Marks was scheduled for an appt on 4/14/2025 with a visit reason of physical.    Letter/MyChart message sent to pt notifying of missed appointment with $50 no show fee.

## (undated) NOTE — ED AVS SNAPSHOT
Mr. Jovon Mercado   MRN: [id number]    Department:  [facility] in [place]   Date of Visit:  5/13/2019           Disclosure     Insurance plans vary and the physician(s) referred by the ER may not be covered by your plan.  Please contac tell this physician (or your personal doctor if your instructions are to return to your personal doctor) about any new or lasting problems. The primary care or specialist physician will see patients referred from the [facility] Emergency Department.  Angelo Ernandez

## (undated) NOTE — Clinical Note
4/15/2025    Roldan [facility]  [address]  [place] [address]    Dear Roldan,    Our records indicate you did not show for your appointment on ***.    To accommodate all our patients, we request at least 24 hours' notice if you need to cancel or reschedule your appointment.    If three appointments are missed within a 12-month period, we may begin the dismissal process for future care at [facility].    We value the relationship we have established with you.  We hope to continue to serve your health care needs.        [facility]

## (undated) NOTE — LETTER
Date & Time: 5/13/2019, 11:39 PM  Patient: Bipin Wood  Encounter Provider(s):    Violet Montaño DO       Occupational restrictions  For: Bipin Wood    Normal weight limits  He may continue to work with your right hand long as the activities are no

## (undated) NOTE — LETTER
4/15/2025    Roldan [facility]  [address]  [place] [address]    Dear Roldan,    Due to a missed appointment on 04/14/2025, your account has been charged $50.    No-show policy    A $50 fee will be charged for missed appointments.    To accommodate all our patients, we require at least 24 hours' notice if you need to cancel or reschedule your appointment.    If three appointments are missed within a 12-month period, we may begin the dismissal process for future care at [facility].    We value the relationship we have established with you.  We hope to continue to serve your health care needs.      Sincerely,    [facility]